# Patient Record
(demographics unavailable — no encounter records)

---

## 2024-12-06 NOTE — HISTORY OF PRESENT ILLNESS
[FreeTextEntry1] : [place] stopped the eliquis around 5/24   Dr Lucas did a stress test and an echo -. cath -. 3 stents (1 on 7/11 and 2 on 7/31/24) -. referred to cardiac rehab. Just started cardiac rehab -. has had 2 session  Told to stop the clopidogrel 2 weeks ago  On the transplant list

## 2024-12-06 NOTE — PHYSICAL EXAM

## 2024-12-06 NOTE — HISTORY OF PRESENT ILLNESS
[FreeTextEntry1] : [place] stopped the eliquis around 5/24   Dr Lucas did a stress test and an echo -. cath -. 3 stents (1 on 7/11 and 2 on 7/31/24) -. referred to cardiac rehab. Just started cardiac rehab -. has had 2 session  Told to stop the clopidogrel 2 weeks ago  On the transplant list

## 2025-01-07 NOTE — HISTORY OF PRESENT ILLNESS
[FreeTextEntry1] : Jan 07, 2025     in person     PCP:  Dr. Josie Avendano (4/30/20)        Nephrology: Dr. Hero Fleming -> C-P to discuss transplant - on list     9/16/2019 started on dialysis - [facility].         Podiatry:  Dr. Tomi Sanchez        Vasc:  Dr. Pk Moreno        Card:  Dr. Theodora Foster         GI: Dr. Audi Kay - colonoscopy          Eyes:  [place] on [facility] - a few months ago.  Advised he has cataracts.                        CC: Type 1 Diabetes (since age 13)         1/2019  A1c 8.3   1/22/20 A1c 9.9 %     12/2023 A1c 9.7%;  3/2024 A1c 6.6%    9/2024  8.1*             CRF (proteinuria ~1992)          9/2019:  started dialysis at [facility]             PVD - 2 stents R foot ~Feb 2015 1/21/2010 R great toe surgery;  then 2nd toe; then R TMT.3/10/2020                         Now s/p R TMT and L great toe surgery              1/2019  TSH 5.41;   3/2021  TSH 4.51   8/26/21  TSH 3.27  65 yo visits for  Type 1 diabetes since age 13, on dialysis since 9/2019, on transplant list, PVD s/p LE stents, s/o R TMT, L great toe surgery, on Eliquis for atrial fibrillation since 1/2023,      Recently had URI - treated at [facility] [place].   Saw Dr. Foster for show pulse in order to optimize him for kidney transplant - will see them January 16, 2025  Had been followed as outpatient by Dr. Comer - sometimes at [facility], for finger  wound.   Sees Dr. Robb now for right 2nd finger wound - still present***  Hospitalized at [facility] - summary appended.    Now following a strict diet and reports sugars much improved.  Doing Cardiac Rehab at [facility]     Injecting insulin at least 3X a day: Basaglar 25 units louis AM  -> LANTUS   And Humalog 75/25  TID AC  - by sliding scale:  if under 140:  0:   if over 140 4 uits;  over 200:  6 units.  Imp:  Infection R 2nd finger needs f/u to [facility]. Feet followed by Dr. Sanchez Continues to find Libre3 helpful and will continue. A1c from [facility] requested.        Sep 17, 2024   in person    Has [employer] - wants Rx t o [facility]   PCP:  Dr. Josie Avendano (4/30/20)        Nephrology: Dr. Hero Fleming -> C-P to discuss transplant - on list     9/16/2019 started on dialysis - [facility].         Podiatry:  Dr. Tomi Sanchez        Vasc:  Dr. Pk Moreno        Card:  Dr. Theodora Foster         GI: Dr. Audi Kay - colonoscopy          Eyes:  [place] on [facility] - a few months ago.  Advised he has cataracts.                        CC: Type 1 Diabetes (since age 13)         1/2019  A1c 8.3   1/22/20 A1c 9.9 %     12/2023 A1c 9.7%;  3/2024 A1c 6.6%    9/2024  8.1*             CRF (proteinuria ~1992)          9/2019:  started dialysis at [facility]             PVD - 2 stents R foot ~Feb 2015 1/21/2010 R great toe surgery;  then 2nd toe; then R TMT.3/10/2020                         Now s/p R TMT and L great toe surgery              1/2019  TSH 5.41;   3/2021  TSH 4.51   8/26/21  TSH 3.27  65 yo with Type 1 diabetes since age 13, on dialysis since 9/2019, on transplant list, PVD s/p LE stents, s/o R TMT, L great toe surgery, on Eliquis for atrial fibrillation since 1/2023,   Had been followed as outpatient by Dr. Comer - sometimes at [facility], for finger  wound. Hospitalized at [facility] - summary appended.    Now following a strict diet and reports sugars much improved.    Injecting insulin at least 3X a day: Basaglar 25 units louis AM   And Humalog 75/25  TID AC  - by sliding scale:  if under 140:  0:   if over 140 4 uits;  over 200:  6 units.  : : Constitutional:  Alert, well nourished, healthy appearance, no acute distress  Eyes:  No proptosis, no stare Thyroid: Pulmonary:  No respiratory distress, no accessory muscle use; normal rate and effort Cardiac:  Normal rate Vascular:  Endocrine:  No stigmata of Cushings Syndrome Musculoskeletal:  Normal gait, no involuntary movements Neurology:  No tremors Affect/Mood/Psych:  Oriented x 3; normal affect, normal insight/judgement, normal mood  . Impression:  Most recent A1c has drifted up to 8.1% He agrees there is room for improvement and he has previously been successful.  Plan:  Dietary adjustment. He continues to find Ann helpful.     Jun 13, 2024     in person                      PCP:  Dr. Josie Avendano (4/30/20)        Nephrology: Dr. Hero Fleming -> C-P to discuss transplant - on list     9/16/2019 started on dialysis - [facility].         Podiatry:  Dr. Tomi Sanchez        Vasc:  Dr. Pk Moreno        Card:  Dr. Theodora Foster         GI: Dr. Audi Kay - colonoscopy          Eyes:  [place] on [facility] - a few months ago.  Advised he has cataracts.                        CC: Type 1 Diabetes (since age 13)         1/2019  A1c 8.3   1/22/20 A1c 9.9 %     12/2023 A1c 9.7%;  3/2024 A1c 6.6%    9/2024  8.1*             CRF (proteinuria ~1992)          9/2019:  started dialysis at [facility]             PVD - 2 stents R foot ~Feb 2015 1/21/2010 R great toe surgery;  then 2nd toe; then R TMT.3/10/2020                         Now s/p R TMT and L great toe surgery              1/2019  TSH 5.41;   3/2021  TSH 4.51   8/26/21  TSH 3.27  65 yo with Type 1 diabetes since age 13, on dialysis since 9/2019, on transplant list, PVD s/p LE stents, s/o R TMT, L great toe surgery, on Eliquis for atrial fibrillation since 1/2023,   Had been followed as outpatient by Dr. Comer - sometimes at [facility], for finger  wound. Hospitalized at [facility] - summary appended.    Now following a strict diet and reports sugars much improved.   Checks fingersticks at least 4X a day. Injecting insulin at least 3X a day: Basaglar 25 units louis AM   And Humalog 75/25  TID AC  - by sliding scale:  if under 140:  0:   if over 140 4 uits;  over 200:  6 units.  : : Constitutional:  Alert, well nourished, healthy appearance, no acute distress  Eyes:  No proptosis, no stare Thyroid: Pulmonary:  No respiratory distress, no accessory muscle use; normal rate and effort Cardiac:  Normal rate Vascular:  Endocrine:  No stigmata of Cushings Syndrome Musculoskeletal:  Normal gait, no involuntary movements Neurology:  No tremors Affect/Mood/Psych:  Oriented x 3; normal affect, normal insight/judgement, normal mood  .  Impression:  Recent A1c up to 8.1 and Libre3 GMI is 8.9.    Plan:  He continues to find Ann helpful in diabetes decisions and insulin dosing. A1c indicates room for improvement.   Apr 16, 2024    in person  PCP:  Dr. Josie Avendano (4/30/20)        Nephrology: Dr. Hero Fleming -> C-P to discuss transplant - on list     9/16/2019 started on dialysis - [facility].         Podiatry:  Dr. Tomi Sanchez        Vasc:  Dr. Pk Moreno        Card:  Dr. Theodora Foster         GI: Dr. Audi Kay - colonoscopy          Eyes:  [place] on [facility] - a few months ago.  Advised he has cataracts.                        CC: Type 1 Diabetes (since age 13)         1/2019  A1c 8.3   1/22/20 A1c 9.9 %     12/2023 A1c 9.7%;  3/2024 A1c 6.6%              CRF (proteinuria ~1992)          9/2019:  started dialysis at [facility]             PV - 2 stents R foot ~Feb 2015 1/21/2010 R great toe surgery;  then 2nd toe; then R TMT.3/10/2020                         Now s/p R TMT and L great toe surgery              1/2019  TSH 5.41;   3/2021  TSH 4.51   8/26/21  TSH 3.27  64 yo with Type 1 diabetes since age 13, on dialysis since 9/2019, on transplant list, PVD s/p LE stents, s/o R TMT, L great toe surgery, on Eliquis for atrial fibrillation since 1/2023, anticipates Watchman procedure at - by Dr. Reed end of May 2023.  Had been followed as outpatient by Dr. Comer - sometimes at [facility], for finger  wound. Hospitalized at [facility] - summary appended.    Now following a strict diet and reports sugars much improved.   Cheks fingersticks at least 4X a day. Injecting insulin at least 3X a day: Basaglar 25 units louis AM   And Humalog 75/25  TID AC  - by sliding scale:  if under 140:  0:   if over 140 4 uits;  over 200:  6 units.  : : Constitutional:  Alert, well nourished, healthy appearance, no acute distress  Eyes:  No proptosis, no stare Thyroid: Pulmonary:  No respiratory distress, no accessory muscle use; normal rate and effort Cardiac:  Normal rate Vascular:  Endocrine:  No stigmata of Cushings Syndrome Musculoskeletal:  Normal gait, no involuntary movements Neurology:  No tremors Affect/Mood/Psych:  Oriented x 3; normal affect, normal insight/judgement, normal mood  . Impression:  His fingerstick BS and A1c results show that he is doing very good job of cotrolling sugars currently. He would be appropriate candidate for the Ann 3.  Plan:  Same Rx. Ann 3 requested via [employer].     A1c trajectory June 2022  A1c 9.8 Dec 2022 A1c 10.0% Mar 8, 2023 A1c 9.8 % at [facility]  Jann 16, 2024  A1c 8.4 %     Feb 06, 2024    in person                  PCP:  Dr. Josie Avendano (4/30/20)        Nephrology: Dr. Hero Fleming -> C-P to discuss transplant - on list     9/16/2019 started on dialysis - [facility].         Podiatry:  Dr. Tomi Sanchez        Vasc:  Dr. Pk Moreno        Card:  Dr. Theodora Foster         GI: Dr. Audi Kay - colonoscopy          Eyes:  [place] on [facility] - a few months ago.  Advised he has cataracts.                        CC: Type 1 Diabetes (since age 13)         1/2019  A1c 8.3   1/22/20 A1c 9.9 %          [place] [facility] [place].               CRF (proteinuria ~1992)          9/2019:  started dialysis at [facility]             PVD - 2 stents R foot ~Feb 2015 1/21/2010 R great toe surgery;  then 2nd toe; then R TMT.3/10/2020                         Now s/p R TMT and L great toe surgery              1/2019  TSH 5.41;   3/2021  TSH 4.51   8/26/21  TSH 3.27  64 yo with Type 1 diabetes since age 13, on dialysis since 9/2019, on transplant list, PVD s/p LE stents, s/o R TMT, L great toe surgery, on Eliquis for atrial fibrillation since 1/2023, anticipates Watchman procedure at - by Dr. Reed end of May 2023.  Had been followed as outpatient by Dr. Comer - sometimes at [facility], for finger  wound. Hospialized at [facility] - summary appended.    Now following a strict diet and reports sugars much improved.   Cheks fingersticks at least 4X a day. Injecting insulin at least 3X a day: Basaglar 25 units louis AM   And Humalog 75/25  TID AC  - by sliding scale:  if under 140:  0:   if over 140 4 uits;  over 200:  6 units.  A1c trajectory June 2022  A1c 9.8 Dec 2022 A1c 10.0% Mar 8, 2023 A1c 9.8 % at [facility]   : : Constitutional:  Alert, well nourished, healthy appearance, no acute distress  Eyes:  No proptosis, no stare Thyroid: Pulmonary:  No respiratory distress, no accessory muscle use; normal rate and effort Cardiac:  Normal rate Vascular:  Endocrine:  No stigmata of Cushings Syndrome Musculoskeletal:  Normal gait, no involuntary movements Neurology:  No tremors Affect/Mood/Psych:  Oriented x 3; normal affect, normal insight/judgement, normal mood  .  Impression:  Brittle Type 1 diabetes, on dialysis.  Would benefit from use of CGM - e.g., Ann 3.   Jan 23, 2024     telephonic     recently admitted to [facility] - elev K and infected finger  PCP:  Dr. Josie Avendano (4/30/20)        Nephrology: Dr. Hero Fleming -> [facility] to discuss transplant - on list     9/16/2019 started on dialysis - [facility].         Podiatry:  Dr. Tomi Sanchez        Vasc:  Dr. Pk Moreno        Card:  Dr. Theodora Foster         GI: Dr. Audi Kay - colonoscopy          Eyes:  [place] on [facility] - a few months ago.  Advised he has cataracts.                        CC: Type 1 Diabetes (since age 13)         1/2019  A1c 8.3   1/22/20 A1c 9.9 %          [facility].               CRF (proteinuria ~1992)          9/2019:  started dialysis at [facility]             PVD - 2 stents R foot ~Feb 2015 1/21/2010 R great toe surgery;  then 2nd toe; then R TMT.3/10/2020                         Now s/p R TMT and L great toe surgery              1/2019  TSH 5.41;   3/2021  TSH 4.51   8/26/21  TSH 3.27  64 yo with Type 1 diabetes since age 13, on dialysis since 9/2019, on transplant list, PVD s/p LE stents, s/o R TMT, L great toe surgery, on Eliquis for atrial fibrillation since 1/2023, anticipates Watchman procedure at [facility] by Dr. Reed end of May 2023.  Had been followed as outpatient by Dr. Comer - sometimes at [facility], for finger  wound. Hospialized at [facility] - summary appended.    Now following a strict diet and reports sugars much improved.   Injecting insulin at least 3X a day: Basaglar 25 units louis AM   And Humalog 75/25  TID AC  - by sliding scale:  if under 140:  0:   if over 140 4 uits;  over 200:  6 units.   June 2022  A1c 9.8 Dec 2022 A1c 10.0% Mar 8, 2023 A1c 9.8 % at [facility]   Imp/Plan:  All things considered, he has been doing a good job taking care of himself and diabetes.       May 16, 2023    in person  PCP:  Dr. Josie Avendano (4/30/20)        Nephrology: Dr. Hero Fleming -> [facility] to discuss transplant - on list     9/16/2019 started on dialysis - [facility].         Podiatry:  Dr. Tomi Sanchez        Vasc:  Dr. Pk Moreno        Card:  Dr. Theodora Foster         GI: Dr. Audi Kay - colonoscopy          Eyes:  [place] on [facility] - a few months ago.  Advised he has cataracts.                        CC: Type 1 Diabetes (since age 13)         1/2019  A1c 8.3   1/22/20 A1c 9.9 %          [place] [facility] [place].               CRF (proteinuria ~1992)          9/2019:  started dialysis at [facility]             PVD - 2 stents R foot ~Feb 2015 1/21/2010 R great toe surgery;  then 2nd toe; then R TMT.3/10/2020                         Now s/p R TMT and L great toe surgery              1/2019  TSH 5.41;   3/2021  TSH 4.51   8/26/21  TSH 3.27  61 yo with Type 1 diabetes since age 13, on dialysis since 9/2019, on transplant list, PVD s/p LE stents, s/o R TMT, L great toe surgery, on Eliquis for atrial fibrillation since 1/2023, anticipates Watchman procedure at - by Dr. Reed end of May 2023.  Brings in his Freestyle Lite meter and data reviewed - testing 4X a day with  One Touch Ultra Mini which shows his sugars run on the high side - in part because of dialysis on Mon, Wed, Fri   [facility] will be faxing results to us.      No recent hypoglycemia.     Injecting insulin at least 3X a day: Basaglar 23 units louis AM   And Humalog 75/25  TID AC  - by sliding scale:  if under 140:  0:   if over 140 4 uits;  over 200:  6 units.  June 2022  A1c 9.8 Dec 2022 A1c 10.0% Mar 8, 2023 A1c 9.8 % at [facility]   : : Constitutional:  Alert, well nourished, healthy appearance, no acute distress  Eyes:  No proptosis, no stare Thyroid: Pulmonary:  No respiratory distress, no accessory muscle use; normal rate and effort Cardiac:  Normal rate Vascular:  Endocrine:  No stigmata of Cushing's Syndrome Musculoskeletal:  Normal gait, no involuntary movements Neurology:  No tremors Affect/Mood/Psych:  Oriented x 3; normal affect, normal insight/judgement, normal mood  .  Impression:  Anticipates bilateral cateract surgery - Dr. Roa. Feet being monitored b Dr. Sanchez  Doing his best to control sugars   Dec 13, 2022     in person  PCP:  Dr. Josie Avendano (4/30/20)        Nephrology: Dr. Hero Fleming -> [facility] to discuss transplant - on list     9/16/2019 started on dialysis - [facility].         Podiatry:  Dr. Tomi Sanchez        Vasc:  Dr. Pk Moreno        Card:  Dr. Theodora Foster         GI: Dr. Audi Kay - colonoscopy          Eyes:  [place] on [facility] - a few months ago.  Advised he has cataracts.                        CC: Type 1 Diabetes (since age 13)         1/2019  A1c 8.3   1/22/20 A1c 9.9 %          His [employer] does not have NFC.               CRF (proteinuria ~1992)          9/2019:  started dialysis at [facility]             PV - 2 stents R foot ~Feb 2015 1/21/2010 R great toe surgery;  then 2nd toe; then R TMT.3/10/2020                         Now s/p R TMT and L great toe surgery              1/2019  TSH 5.41;   3/2021  TSH 4.51   8/26/21  TSH 3.27  62 yo visits for Type 1 diabetes.   Lab tests at [facility] on [place], learned of "insulin ":  Actos, Januvia, semaglutide  Brings in his Freestyle Lite meter and data reviewed - testing 4X a day. No recent hypoglycemia.     Injecting insulin at least 3X a day: Basaglar 23 units louis AM   And Humalog 75/25  TID AC  - by sliding scale:  if under 140:  0:   if over 140 4 uits;  over 200:  6 units.  June A1c 9.8  : : Constitutional:  Alert, well nourished, healthy appearance, no acute distress  Eyes:  No proptosis, no stare Thyroid: Pulmonary:  No respiratory distress, no accessory muscle use; normal rate and effort Cardiac:  Normal rate Vascular:  Endocrine:  No stigmata of Cushing's Syndrome Musculoskeletal:  Normal gait, no involuntary movements Neurology:  No tremors Affect/Mood/Psych:  Oriented x 3; normal affect, normal insight/judgement, normal mood  .  Impression:  Room for improvement. Plan:  f/u eye exam.   Lbrie 2 requested from [facility].      Jul 26, 2022      in person               No success with EP, Bor.        [facility].      PCP:  Dr. Josie Avendano (4/30/20)        Nephrology: Dr. Hero Fleming -> C-P to discuss transplant - on list     9/16/2019 started on dialysis - [facility].         Podiatry:  Dr. Tomi Sanchez        Vasc:  Dr. Pk Moreno        Card:  Dr. Theodora Foster         GI: Dr. Audi Kay - colonoscopy          Eyes:  [place] on [facility] - a few months ago.  Advised he has cataracts.                        CC: Type 1 Diabetes (since age 13)         1/2019  A1c 8.3   1/22/20 A1c 9.9 %          [place] [facility] [place].               CRF (proteinuria ~1992)          9/2019:  started dialysis at [facility]             PVD - 2 stents R foot ~Feb 2015 1/21/2010 R great toe surgery;  then 2nd toe; then R TMT.3/10/2020                         Now s/p R TMT and L great toe surgery              1/2019  TSH 5.41;   3/2021  TSH 4.51   8/26/21  TSH 3.27  62 yo visits for Type 1 diabetes.   Lab tests at [facility] on [place], learned of "insulin ":  Actos, Januvia, semaglutide  Brings in his Freestyle Lite meter and data reviewed - testing 4X a day. No recent hypoglycemia.     Taking:   Basaglar 23 units louis AM   And Humalog 75/25  TID AC  - by sliding scale:  if under 140:  0:   if over 140 4 uits;  over 200:  6 units.    : : Constitutional:  Alert, well nourished, healthy appearance, no acute distress  Eyes:  No proptosis, no stare Thyroid: Pulmonary:  No respiratory distress, no accessory muscle use; normal rate and effort Cardiac:  Normal rate Vascular:  Endocrine:  No stigmata of Cushing's Syndrome Musculoskeletal:  Normal gait, no involuntary movements Neurology:  No tremors Affect/Mood/Psych:  Oriented x 3; normal affect, normal insight/judgement, normal mood  .  Impression:  A1c and fingerstick BS suggest room to raise dose of insulin; Plan: Inc Basaglar to 24 units. Call me one week.      Mar 22, 2022      in person     primary:  [employer] Medicare Rx and [place] Medicaid secondary.  NOT covered for EP    PCP:  Dr. Josie Avendano (4/30/20)        Nephrology: Dr. Hero Fleming -> C-P to discuss transplant - on list     9/16/2019 started on dialysis - [facility].         Podiatry:  Dr. Tomi Sanchez        Vasc:  Dr. Pk Moreno        Card:  Dr. Theodora Foster         GI: Dr. Audi Kay - colonoscopy          Eyes:  [place] on [facility] - a few months ago.  Advised he has cataracts.                        CC: Type 1 Diabetes (since age 13)         1/2019  A1c 8.3   1/22/20 A1c 9.9 %          [place] [facility] [place].               CRF (proteinuria ~1992)          9/2019:  started dialysis at [facility]             PVD - 2 stents R foot ~Feb 2015 1/21/2010 R great toe surgery;  then 2nd toe; then R TMT.3/10/2020                         Now s/p R TMT and L great toe surgery              1/2019  TSH 5.41;   3/2021  TSH 4.51   8/26/21  TSH 3.27  62 yo visits for Type 1 diabetes.   Lab tests at [facility] on 8/26/2021 A1c 10.1 %      fructosamine 548   He remains on dialysis at [facility] on Mon, Wed, Fri.  Brings in the OneTouch Ultra Mini with which he is checking his fingerstick sugars at least 4X a day - shows wide fluctuations.   To control diabetes his insulin Rx is:   Lantus pens 22 units at 5 am and now switched to Basaglar And Humalog 75/25  TID 1c - by sliding scale:  if under 140:  0:   if over 140 4 uits;  over 200:  6 units.  : : Constitutional:  Alert, well nourished, healthy appearance, no acute distress  Eyes:  No proptosis, no stare Thyroid: Pulmonary:  No respiratory distress, no accessory muscle use; normal rate and effort Cardiac:  Normal rate Vascular:  Endocrine:  No stigmata of Cushing's Syndrome Musculoskeletal:  Normal gait, no involuntary movements Neurology:  No tremors Affect/Mood/Psych:  Oriented x 3; normal affect, normal insight/judgement, normal mood  .  Impression:  Although he is doing his best to control sugars, the renal failure, dialysis make it more challenging. Fortunately, no current foot lesions. He anticipates need for cataract surgery by Dr. Roa and will touch base with me on insulin dosing priror to that. ROV no later than July. He will stop by [facility] today regarding CGM.     Sep 13, 2021      in person               PCP:  Dr. Josie Avendano (4/30/20)        Nephrology: Dr. Hero Fleming -> C-P to discuss transplant - on list     9/16/2019 started on dialysis - [facility].         Podiatry:  Dr. Tomi Sanchez        Vasc:  Dr. Pk Moreno        Card:  Dr. Theodora Foster         GI: Dr. Audi Kay - colonoscopy          Eyes:  [place] on [facility] - a few months ago.  Advised he has cataracts.                        CC: Type 1 Diabetes (since age 13)         1/2019  A1c 8.3   1/22/20 A1c 9.9 %          His [employer] does not have NFC.               CRF (proteinuria ~1992)          9/2019:  started dialysis at [facility]             PVD - 2 stents R foot ~Feb 2015 1/21/2010 R great toe surgery;  then 2nd toe; then R TMT.3/10/2020                         Now s/p R TMT and L great toe surgery              1/2019  TSH 5.41;   3/2021  TSH 4.51   8/26/21  TSH 3.27  62 yo visits for Type 1 diabetes.   Lab tests at [facility] on 8/26/2021 A1c 10.1 %      fructosamine 548  glucose 295 mg/dl  creatinine 6.32 TSH 3.27 (had been 5.41 in Jan 2019 and 4.51 in Mar 2021)   Goes for dialysis on M, W, F  - prone to hypoglycemia during dialysis  Has Lantus pens at home and takes 22 units at 5 am and insurance now covers Semglee (also glargine) And Humalog 75/25  TID 1c - by sliding scale:  if under 140:  0:   if over 140 4 uits;  over 200:  6 units.  No open wounds. Still has burning sensation of lower extremities .    Plan:  Ann 14 requested via [employer]  ([employer] used [facility])   ROV in Jan and March.    .

## 2025-01-17 NOTE — HEALTH RISK ASSESSMENT
[Never (0 pts)] : Never (0 points) [No] : In the past 12 months have you used drugs other than those required for medical reasons? No [No falls in past year] : Patient reported no falls in the past year [0] : 2) Feeling down, depressed, or hopeless: Not at all (0) [PHQ-2 Negative - No further assessment needed] : PHQ-2 Negative - No further assessment needed [Former] : Former [de-identified] : quit 31 yrs ago [de-identified] : none [de-identified] : Regular  [[id number]] : 0 [de-identified] : half a pack a day for 2-3 years

## 2025-01-17 NOTE — HISTORY OF PRESENT ILLNESS
[de-identified] : Patient is a 64M with Type 1 DM (diagnosed at 12yo), ESRD on HD M/W/F (Since sept 2019, follows with Dr. Fleming, HD at [facility]), CAD s/p mLAD PCI (9/22), diastolic dysfunction, PAD (following with Dr. Moreno)with hx of RLE balloon angioplasty and 2 RANJITH in right distal AT (2/2015), right popliteal to DP bypass and right first metatarsal amputation, amputation of 2nd toe and partial metatarsal 3/5/2020 followed by a trans-metatarsal amputation on 3/10/2020, left partial great toe and left partial 2nd toe amputation (2021), recent staged PCI (1 on 7/11/24, 2 on 7/31/24) previous PCI to mLAD in 2022,  pAF (EP Dr. Corrie Dimas) presents today in follow up for pain management and cough  cough x 3 weeks. Wife was very sick with cough as well.   Recent stents x 3 in July Cardio: Dr. Fabi Carson with [facility]  Long discussion with Abelardo regarding chronic pain (see assessment)

## 2025-01-17 NOTE — HISTORY OF PRESENT ILLNESS
[de-identified] : Patient is a 64M with Type 1 DM (diagnosed at 14yo), ESRD on HD M/W/F (Since sept 2019, follows with Dr. Fleming, HD at [facility]), CAD s/p mLAD PCI (9/22), diastolic dysfunction, PAD (following with Dr. Moreno)with hx of RLE balloon angioplasty and 2 RANJITH in right distal AT (2/2015), right popliteal to DP bypass and right first metatarsal amputation, amputation of 2nd toe and partial metatarsal 3/5/2020 followed by a trans-metatarsal amputation on 3/10/2020, left partial great toe and left partial 2nd toe amputation (2021), recent staged PCI (1 on 7/11/24, 2 on 7/31/24) previous PCI to mLAD in 2022,  pAF (EP Dr. Corrie Dimas) presents today in follow up for pain management and cough  cough x 3 weeks. Wife was very sick with cough as well.   Recent stents x 3 in July Cardio: Dr. Fabi Carson with [facility]  Long discussion with Abelardo regarding chronic pain (see assessment)

## 2025-01-17 NOTE — HEALTH RISK ASSESSMENT
[Never (0 pts)] : Never (0 points) [No] : In the past 12 months have you used drugs other than those required for medical reasons? No [No falls in past year] : Patient reported no falls in the past year [0] : 2) Feeling down, depressed, or hopeless: Not at all (0) [PHQ-2 Negative - No further assessment needed] : PHQ-2 Negative - No further assessment needed [Former] : Former [de-identified] : quit 31 yrs ago [de-identified] : none [de-identified] : Regular  [[id number]] : 0 [de-identified] : half a pack a day for 2-3 years

## 2025-02-06 NOTE — REVIEW OF SYSTEMS
[Fever] : no fever [Chills] : no chills [Joint Swelling] : no joint swelling [Limb Pain] : no limb pain [Limb Swelling] : no limb swelling [Skin Wound] : skin wound

## 2025-02-06 NOTE — HISTORY OF PRESENT ILLNESS
[FreeTextEntry1] : 63-year-old male well-known to me.  He is status post a right upper extremity angiogram and angioplasty of his radial artery for gangrene of his pointer finger.  Patient reports that he has been undergoing local wound care at [facility] performed by his hand surgeon.  He reports that his finger pain has resolved.  He reports that his wound is healing. He continues to take Eliquis and Plavix as prescribed.  [de-identified] : 64-year-old male well-known to me. He has a history of a right pop dp bypass, right TMA and a left lower extremity angiogram and angioplasty for gangrene. He is status post a right upper extremity angiogram and angioplasty of his radial artery for gangrene of his pointer finger. He reports he remains well since the last visit. He reports the wound on his pointer finger is still not healed. He underwent bypass graft duplex of the lower extremity and is here to discuss the results and additional treatment options. He continues to undergo uneventful HD via the left brachial cephalic AVF.

## 2025-02-06 NOTE — DATA REVIEWED
[FreeTextEntry1] : Bypass graft duplex - patent bypass without anastomotic stenosis, normal intragraft velocities

## 2025-02-06 NOTE — PHYSICAL EXAM
[Alert] : alert [Oriented to Person] : oriented to person [Oriented to Place] : oriented to place [Oriented to Time] : oriented to time [0] : right 0 [2+] : right 2+ [de-identified] : Awake and Alert [FreeTextEntry1] : 2+ bypass graft pulse [de-identified] : right pointer finger with necrotic ulceration -remainder of the fingers are healed, TMA well healed [de-identified] : Appropriate

## 2025-02-06 NOTE — HISTORY OF PRESENT ILLNESS
[FreeTextEntry1] : 63-year-old male well-known to me.  He is status post a right upper extremity angiogram and angioplasty of his radial artery for gangrene of his pointer finger.  Patient reports that he has been undergoing local wound care at [facility] performed by his hand surgeon.  He reports that his finger pain has resolved.  He reports that his wound is healing. He continues to take Eliquis and Plavix as prescribed.  [de-identified] : 64-year-old male well-known to me. He has a history of a right pop dp bypass, right TMA and a left lower extremity angiogram and angioplasty for gangrene. He is status post a right upper extremity angiogram and angioplasty of his radial artery for gangrene of his pointer finger. He reports he remains well since the last visit. He reports the wound on his pointer finger is still not healed. He underwent bypass graft duplex of the lower extremity and is here to discuss the results and additional treatment options. He continues to undergo uneventful HD via the left brachial cephalic AVF.

## 2025-02-06 NOTE — ASSESSMENT
[FreeTextEntry1] : 63 yo male with severe PAD. He is s/p a right pop dp bypass.  He has a 2+ bypass graft pulse on the right. The patient underwent a right lower extremity arterial duplex that demonstrated a patent bypass graft with no anastomotic stenosis and normal intragraft velocities.   He is also status post an angiogram of his right upper extremity and angioplasty of his right radial artery secondary to gangrene of multiple fingers. His pointer finger continues to have a gangrenous ulcer. I recommended that he undergo an upper extremity us. He may benefit from a repeat angiogram.   Repeat bypass graft duplex in 1 year  Follow up when the results of the right upper extremity angiogram are available. Local wound care to pointer finger.

## 2025-02-06 NOTE — PHYSICAL EXAM
[Alert] : alert [Oriented to Person] : oriented to person [Oriented to Place] : oriented to place [Oriented to Time] : oriented to time [0] : right 0 [2+] : right 2+ [de-identified] : Awake and Alert [FreeTextEntry1] : 2+ bypass graft pulse [de-identified] : right pointer finger with necrotic ulceration -remainder of the fingers are healed, TMA well healed [de-identified] : Appropriate

## 2025-02-13 NOTE — HEALTH RISK ASSESSMENT
[Never (0 pts)] : Never (0 points) [No] : In the past 12 months have you used drugs other than those required for medical reasons? No [PHQ-2 Negative - No further assessment needed] : PHQ-2 Negative - No further assessment needed [Former] : Former [No falls in past year] : Patient reported no falls in the past year [0] : 2) Feeling down, depressed, or hopeless: Not at all (0) [de-identified] : quit 31 yrs ago [de-identified] : none [de-identified] : Regular  [[id number]] : 0 [de-identified] : half a pack a day for 2-3 years

## 2025-02-13 NOTE — HISTORY OF PRESENT ILLNESS
[FreeTextEntry8] : Patient is a 64M with Type 1 DM (diagnosed at 14yo), ESRD on HD M/W/F (Since sept 2019, follows with Dr. Fleming, HD at [facility]), CAD s/p mLAD PCI (9/22), diastolic dysfunction, PAD (following with Dr. Moreno)with hx of RLE balloon angioplasty and 2 RANJITH in right distal AT (2/2015), right popliteal to DP bypass and right first metatarsal amputation, amputation of 2nd toe and partial metatarsal 3/5/2020 followed by a trans-metatarsal amputation on 3/10/2020, left partial great toe and left partial 2nd toe amputation (2021), recent staged PCI (1 on 7/11/24, 2 on 7/31/24) previous PCI to mLAD in 2022, pAF (EP Dr. Corrie Dimas) presents today for continued cough  Now with cough x 7 weeks total I saw Abelardo 4 weeks ago and rx'd doxycycline which improved symptoms for about 2 weeks Cough is consistent and keeping him up at night Doing cardiac rehab and noting it is difficult but he gets through the exercises which are vigorous

## 2025-02-13 NOTE — HEALTH RISK ASSESSMENT
[Never (0 pts)] : Never (0 points) [No] : In the past 12 months have you used drugs other than those required for medical reasons? No [PHQ-2 Negative - No further assessment needed] : PHQ-2 Negative - No further assessment needed [Former] : Former [No falls in past year] : Patient reported no falls in the past year [0] : 2) Feeling down, depressed, or hopeless: Not at all (0) [de-identified] : quit 31 yrs ago [de-identified] : none [de-identified] : Regular  [[id number]] : 0 [de-identified] : half a pack a day for 2-3 years

## 2025-02-13 NOTE — REVIEW OF SYSTEMS
[Cough] : cough [Dyspnea on Exertion] : dyspnea on exertion [Joint Pain] : joint pain [Muscle Pain] : muscle pain [Negative] : Heme/Lymph

## 2025-02-13 NOTE — PHYSICAL EXAM
[Normal Sclera/Conjunctiva] : normal sclera/conjunctiva [Normal Outer Ear/Nose] : the outer ears and nose were normal in appearance [No Lymphadenopathy] : no lymphadenopathy [Supple] : supple [No Respiratory Distress] : no respiratory distress  [No Accessory Muscle Use] : no accessory muscle use [Normal] : affect was normal and insight and judgment were intact [de-identified] : RML and RLL with crackles, LLL with crackles

## 2025-02-13 NOTE — HISTORY OF PRESENT ILLNESS
[FreeTextEntry8] : Patient is a 64M with Type 1 DM (diagnosed at 12yo), ESRD on HD M/W/F (Since sept 2019, follows with Dr. Fleming, HD at [facility]), CAD s/p mLAD PCI (9/22), diastolic dysfunction, PAD (following with Dr. Moreno)with hx of RLE balloon angioplasty and 2 RANJITH in right distal AT (2/2015), right popliteal to DP bypass and right first metatarsal amputation, amputation of 2nd toe and partial metatarsal 3/5/2020 followed by a trans-metatarsal amputation on 3/10/2020, left partial great toe and left partial 2nd toe amputation (2021), recent staged PCI (1 on 7/11/24, 2 on 7/31/24) previous PCI to mLAD in 2022, pAF (EP Dr. Corrie Dimas) presents today for continued cough  Now with cough x 7 weeks total I saw Abelardo 4 weeks ago and rx'd doxycycline which improved symptoms for about 2 weeks Cough is consistent and keeping him up at night Doing cardiac rehab and noting it is difficult but he gets through the exercises which are vigorous

## 2025-02-13 NOTE — PHYSICAL EXAM
[Normal Sclera/Conjunctiva] : normal sclera/conjunctiva [Normal Outer Ear/Nose] : the outer ears and nose were normal in appearance [No Lymphadenopathy] : no lymphadenopathy [Supple] : supple [No Respiratory Distress] : no respiratory distress  [No Accessory Muscle Use] : no accessory muscle use [Normal] : affect was normal and insight and judgment were intact [de-identified] : RML and RLL with crackles, LLL with crackles

## 2025-02-24 NOTE — HISTORY OF PRESENT ILLNESS
[de-identified] : Patient is a 64M with Type 1 DM (diagnosed at 14yo), ESRD on HD M/W/F (Since sept 2019, follows with Dr. Fleming, HD at [facility]), CAD s/p mLAD PCI (9/22), diastolic dysfunction, PAD (following with Dr. Moreno)with hx of RLE balloon angioplasty and 2 RANJITH in right distal AT (2/2015), right popliteal to DP bypass and right first metatarsal amputation, amputation of 2nd toe and partial metatarsal 3/5/2020 followed by a trans-metatarsal amputation on 3/10/2020, left partial great toe and left partial 2nd toe amputation (2021), recent staged PCI (1 on 7/11/24, 2 on 7/31/24) previous PCI to mLAD in 2022,  pAF (EP Dr. Corrie Dimas) presents today in follow up for pneumonia  Finally feeling better after course of Levaquin Breathing better as well Cough resolved last week

## 2025-02-24 NOTE — HISTORY OF PRESENT ILLNESS
[de-identified] : Patient is a 64M with Type 1 DM (diagnosed at 14yo), ESRD on HD M/W/F (Since sept 2019, follows with Dr. Fleming, HD at [facility]), CAD s/p mLAD PCI (9/22), diastolic dysfunction, PAD (following with Dr. Moreno)with hx of RLE balloon angioplasty and 2 RANJITH in right distal AT (2/2015), right popliteal to DP bypass and right first metatarsal amputation, amputation of 2nd toe and partial metatarsal 3/5/2020 followed by a trans-metatarsal amputation on 3/10/2020, left partial great toe and left partial 2nd toe amputation (2021), recent staged PCI (1 on 7/11/24, 2 on 7/31/24) previous PCI to mLAD in 2022,  pAF (EP Dr. Corrie Dimas) presents today in follow up for pneumonia  Finally feeling better after course of Levaquin Breathing better as well Cough resolved last week

## 2025-02-24 NOTE — HEALTH RISK ASSESSMENT
[Never (0 pts)] : Never (0 points) [No] : In the past 12 months have you used drugs other than those required for medical reasons? No [No falls in past year] : Patient reported no falls in the past year [0] : 2) Feeling down, depressed, or hopeless: Not at all (0) [PHQ-2 Negative - No further assessment needed] : PHQ-2 Negative - No further assessment needed [Former] : Former [de-identified] : quit 31 yrs ago [de-identified] : none [de-identified] : Regular  [[id number]] : 0 [de-identified] : half a pack a day for 2-3 years

## 2025-02-24 NOTE — HEALTH RISK ASSESSMENT
[Never (0 pts)] : Never (0 points) [No] : In the past 12 months have you used drugs other than those required for medical reasons? No [No falls in past year] : Patient reported no falls in the past year [0] : 2) Feeling down, depressed, or hopeless: Not at all (0) [PHQ-2 Negative - No further assessment needed] : PHQ-2 Negative - No further assessment needed [Former] : Former [de-identified] : quit 31 yrs ago [de-identified] : none [de-identified] : Regular  [[id number]] : 0 [de-identified] : half a pack a day for 2-3 years

## 2025-03-05 NOTE — PHYSICAL EXAM
[0] : right 0 [2+] : right 2+ [Alert] : alert [Oriented to Person] : oriented to person [Oriented to Place] : oriented to place [Oriented to Time] : oriented to time [de-identified] : Awake and Alert [FreeTextEntry1] : 2+ bypass graft pulse [de-identified] : right pointer finger with necrotic ulceration -remainder of the fingers are healed, TMA well healed [de-identified] : Appropriate

## 2025-03-05 NOTE — REVIEW OF SYSTEMS
[Skin Wound] : skin wound [Fever] : no fever [Chills] : no chills [Joint Swelling] : no joint swelling [Limb Pain] : no limb pain [Limb Swelling] : no limb swelling

## 2025-03-05 NOTE — ASSESSMENT
[FreeTextEntry1] : 63 yo male with severe PAD.  He is also status post an angiogram of his right upper extremity and angioplasty of his right radial artery secondary to gangrene of multiple fingers. His pointer finger continues to remain unhealed despite undergoing local wound care. He underwent a right upper extremity arterial duplex which demonstrated that his radial artery is occluded. I explained to the patient that the pointer finger is in the radial artery distribution. and that I believe he would benefit from a repeat intervention.  Risks and benefits were discussed including infection, bleeding and hematoma and he would like to proceed. He will be scheduled for the procedure as soon as possible.  Continue local wound care to pointer finger daily.

## 2025-03-05 NOTE — DATA REVIEWED
[FreeTextEntry1] : Right upper extremity arterial duplex - personally reviewed- radial artery occlusion

## 2025-03-05 NOTE — HISTORY OF PRESENT ILLNESS
[FreeTextEntry1] : 63-year-old male well-known to me.  He is status post a right upper extremity angiogram and angioplasty of his radial artery for gangrene of his pointer finger.  Patient reports that he has been undergoing local wound care at [facility] performed by his hand surgeon.  He reports that his finger pain has resolved.  He reports that his wound is healing. He continues to take Eliquis and Plavix as prescribed.  [de-identified] : 64-year-old male well-known to me. He has a history of a right pop dp bypass, right TMA and a left lower extremity angiogram and angioplasty for gangrene. He is status post a right upper extremity angiogram and angioplasty of his radial artery for gangrene of his pointer finger. He continues to have an ulcer on his pointer finger despite undergoing local wound care. He underwent right upper extremity arterial duplex and is here to discuss the results and additional treatment options. He continues to undergo uneventful HD via the left brachial cephalic AVF.

## 2025-03-05 NOTE — END OF VISIT
[FreeTextEntry3] :  All medical record entries made by the azeemibe were at myLEONEL KENNETH, direction and personally dictated by me on 3/5/2025. I have reviewed the chart and agree that the record accurately reflects my personal performance of the history, physical exam, assessment, and plan. I have also personally directed, reviewed, and agreed with the chart.

## 2025-04-23 NOTE — END OF VISIT
[FreeTextEntry3] :  All medical record entries made by the azeemibe were at LEONEL pa KENNETH, direction and personally dictated by me on 4/23/2025. I have reviewed the chart and agree that the record accurately reflects my personal performance of the history, physical exam, assessment, and plan. I have also personally directed, reviewed, and agreed with the chart.

## 2025-04-23 NOTE — HISTORY OF PRESENT ILLNESS
[FreeTextEntry1] : 63-year-old male well-known to me.  He is status post a right upper extremity angiogram and angioplasty of his radial artery for gangrene of his pointer finger.  Patient reports that he has been undergoing local wound care at [facility] performed by his hand surgeon.  He reports that his finger pain has resolved.  He reports that his wound is healing. He continues to take Eliquis and Plavix as prescribed.  [de-identified] : 64-year-old male well-known to me. He has a history of a right pop dp bypass, right TMA and a left lower extremity angiogram and angioplasty for gangrene. He is status post a right upper extremity angiogram and angioplasty of his radial artery for gangrene of his pointer finger. He continued to have an ulcer despite local wound care and underwent an arterial duplex which demonstrated a recurrent radial artery occlusion.  He is now s/p a repeat right upper extremity angiogram and angioplasty of his radial artery for gangrene of his pointer finger approx. two weeks ago. The patient reports the wound appears to be getting better.  He is here for a pos operative wound check.

## 2025-04-23 NOTE — PHYSICAL EXAM
[Alert] : alert [Oriented to Person] : oriented to person [Oriented to Place] : oriented to place [Oriented to Time] : oriented to time [1+] : right 1+ [de-identified] : Awake and Alert [FreeTextEntry1] : biphasic radial artery pulse [de-identified] : right pointer finger with  ulceration - healing , remainder of the fingers are healed [de-identified] : Appropriate

## 2025-04-23 NOTE — ASSESSMENT
[FreeTextEntry1] : 63 yo male with severe PAD.  He is also status post an angiogram of his right upper extremity and angioplasty of his right radial artery secondary to gangrene of multiple fingers. His pointer finger remained unhealed despite undergoing local wound care and a arterial duplex demonstrated a recurrent radial artery occlusion. He is now s/p a repeat right upper extremity angiogram  and angioplasty of his right radial artery  2 weeks ago. On exam the patient has a 1+ radial artery pulse and a biphasic radial artery signal. The patient's ulceration appears to be healing.  . I am hopeful the wound will heal in the next several weeks. The patient will follow up in 6 weeks for a wound check.  Continue local wound care to pointer finger daily.

## 2025-06-04 NOTE — HISTORY OF PRESENT ILLNESS
[FreeTextEntry1] : 63-year-old male well-known to me.  He is status post a right upper extremity angiogram and angioplasty of his radial artery for gangrene of his pointer finger.  Patient reports that he has been undergoing local wound care at [facility] performed by his hand surgeon.  He reports that his finger pain has resolved.  He reports that his wound is healing. He continues to take Eliquis and Plavix as prescribed.  [de-identified] : 64-year-old male well-known to me. He has a history of a right pop dp bypass, right TMA and a left lower extremity angiogram and angioplasty for gangrene. He is status post a right upper extremity angiogram and angioplasty of his radial artery for gangrene of his pointer finger. He continued to have an ulcer despite local wound care and underwent an arterial duplex which demonstrated a recurrent radial artery occlusion.  He is now s/p a repeat right upper extremity angiogram and angioplasty of his radial artery for gangrene of his pointer finger. The patient reports the wound appears to be getting better since the last visit. He denies associated pain.  He is here for a post operative wound check.

## 2025-06-04 NOTE — PHYSICAL EXAM
[1+] : right 1+ [Alert] : alert [Oriented to Person] : oriented to person [Oriented to Place] : oriented to place [Oriented to Time] : oriented to time [de-identified] : Awake and Alert [FreeTextEntry1] : biphasic radial artery pulse [de-identified] : right pointer finger with  ulceration - healing , remainder of the fingers are healed [de-identified] : Appropriate

## 2025-06-04 NOTE — ASSESSMENT
[FreeTextEntry1] : 63 yo male with severe PAD.  He is also status post an angiogram of his right upper extremity and angioplasty of his right radial artery secondary to gangrene of multiple fingers. His pointer finger remained unhealed despite undergoing local wound care and an arterial duplex demonstrated a recurrent radial artery occlusion. He is now s/p a repeat right upper extremity angiogram  and angioplasty of his right radial artery. On exam the patient has a 1+ radial artery pulse and a biphasic radial artery signal. The patient's ulceration appears to be healing. Patient understands that he may need a repeat arterial intervention if the wound does not heal despite local wound care.  The patient is scheduled to see a podiatrist in the upcoming weeks. He will follow up with me after his appointment with the podiatrist. Continue local wound care to pointer finger daily.

## 2025-06-04 NOTE — HISTORY OF PRESENT ILLNESS
[FreeTextEntry1] : 63-year-old male well-known to me.  He is status post a right upper extremity angiogram and angioplasty of his radial artery for gangrene of his pointer finger.  Patient reports that he has been undergoing local wound care at [facility] performed by his hand surgeon.  He reports that his finger pain has resolved.  He reports that his wound is healing. He continues to take Eliquis and Plavix as prescribed.  [de-identified] : 64-year-old male well-known to me. He has a history of a right pop dp bypass, right TMA and a left lower extremity angiogram and angioplasty for gangrene. He is status post a right upper extremity angiogram and angioplasty of his radial artery for gangrene of his pointer finger. He continued to have an ulcer despite local wound care and underwent an arterial duplex which demonstrated a recurrent radial artery occlusion.  He is now s/p a repeat right upper extremity angiogram and angioplasty of his radial artery for gangrene of his pointer finger. The patient reports the wound appears to be getting better since the last visit. He denies associated pain.  He is here for a post operative wound check.

## 2025-06-04 NOTE — ASSESSMENT
[FreeTextEntry1] : 65 yo male with severe PAD.  He is also status post an angiogram of his right upper extremity and angioplasty of his right radial artery secondary to gangrene of multiple fingers. His pointer finger remained unhealed despite undergoing local wound care and an arterial duplex demonstrated a recurrent radial artery occlusion. He is now s/p a repeat right upper extremity angiogram  and angioplasty of his right radial artery. On exam the patient has a 1+ radial artery pulse and a biphasic radial artery signal. The patient's ulceration appears to be healing. Patient understands that he may need a repeat arterial intervention if the wound does not heal despite local wound care.  The patient is scheduled to see a podiatrist in the upcoming weeks. He will follow up with me after his appointment with the podiatrist. Continue local wound care to pointer finger daily.

## 2025-06-04 NOTE — PHYSICAL EXAM
[1+] : right 1+ [Alert] : alert [Oriented to Person] : oriented to person [Oriented to Place] : oriented to place [Oriented to Time] : oriented to time [de-identified] : Awake and Alert [FreeTextEntry1] : biphasic radial artery pulse [de-identified] : right pointer finger with  ulceration - healing , remainder of the fingers are healed [de-identified] : Appropriate

## 2025-06-18 NOTE — HISTORY OF PRESENT ILLNESS
[FreeTextEntry1] : DOS:  2025   Patient Name:  NALDO MORRIS   : 1960  MRN:  [id number]        in person  (L)  PCP:  Dr. Josie Avendano (25)        Nephrology: Dr. Hero Fleming -> C-P to discuss transplant - on list     2019 started on dialysis - [facility].         Podiatry:  Dr. Tomi Sanchez        Vasc:  Dr. Pk Moreno        Card:  Dr. Theodora Foster         GI: Dr. Audi Kay - colonoscopy          Eyes:  [place] on [facility] - a few months ago.  Advised he has cataracts.                        CC: Type 1 Diabetes (since age 13)         2019  A1c 8.3   20 A1c 9.9 %     2023 A1c 9.7%;  3/2024 A1c 6.6%    2024  8.1*             CRF (proteinuria ~)          2019:  started dialysis at [facility]             PVD - 2 stents R foot ~2010 R great toe surgery;  then 2nd toe; then R TMT.3/10/2020                         Now s/p R TMT and L great toe surgery              2019  TSH 5.41;   3/2021  TSH 4.51   21  TSH 3.27  63 yo visits for  Type 1 diabetes since age 13, on dialysis since 2019, on transplant list, PVD s/p LE stents, s/o R TMT, L great toe surgery, on Eliquis for atrial fibrillation since 2023,      Recently had URI - treated at [facility].   Has seen Dr. Foster for bradycardia   Had been followed as outpatient by Dr. Comer - sometimes at [facility], for finger  wound.   Sees Dr. Mims at [facility]  -   R index finger.  He will  see her later today 25).  Seeing Dr. Kay for treatment of retina. On hiatus from cardiac rhab   Recently told he is not on transplant list, but he is looking into alternatives.    Hospitalized at [facility] - summary appended.    Now following a strict diet and reports sugars much improved.  Doing Cardiac Rehab at [facility]     Injecting insulin at least 3X a day: Basaglar 25 units louis AM  -> LANTUS   And Humalog 75/25  TID AC  - by sliding scale:  if under 140:  0:   if over 140 4 uits;  over 200:  6 units. Jardiance    CGM:  Ann 3 Plus  14 days GMI 8.9% > 250  35% 181-250  4 &   44 %54-70  0 %  : : Constitutional:  Alert, well nourished, healthy appearance, no acute distress  Eyes:  No proptosis, no stare Thyroid: Pulmonary:  No respiratory distress, no accessory muscle use; normal rate and effort Cardiac:  Normal rate Vascular:  Endocrine:  No stigmata of Cushings Syndrome Musculoskeletal:  Normal gait, no involuntary movements Neurology:  No tremors Affect/Mood/Psych:  Oriented x 3; normal affect, normal insight/judgement, normal mood  .   Imp:  Infection R 2nd finger needs f/u to [facility]. Feet followed by Dr. Sanchez Continues to find Libre3 helpful and will continue. A1c from D. requested.      2025     in person     PCP:  Dr. Josie Avendano (20)        Nephrology: Dr. Hero Fleming -> C-P to discuss transplant - on list     2019 started on dialysis - [facility].         Podiatry:  Dr. Tomi Sanchez        Vasc:  Dr. Pk Moreno        Card:  Dr. Theodora Foster         GI: Dr. Audi Kay - colonoscopy          Eyes:  [place] on [facility] - a few months ago.  Advised he has cataracts.                        CC: Type 1 Diabetes (since age 13)         2019  A1c 8.3   20 A1c 9.9 %     2023 A1c 9.7%;  3/2024 A1c 6.6%    2024  8.1*             CRF (proteinuria ~)          2019:  started dialysis at [facility]             PVD - 2 stents R foot ~2010 R great toe surgery;  then 2nd toe; then R TMT.3/10/2020                         Now s/p R TMT and L great toe surgery              2019  TSH 5.41;   3/2021  TSH 4.51   21  TSH 3.27  63 yo visits for  Type 1 diabetes since age 13, on dialysis since 2019, on transplant list, PVD s/p LE stents, s/o R TMT, L great toe surgery, on Eliquis for atrial fibrillation since 2023,      Recently had URI - treated at [facility].   Saw Dr. Foster for show pulse in order to optimize him for kidney transplant - will see them 2025  Had been followed as outpatient by Dr. Comer - sometimes at [facility], for finger  wound.   Sees Dr. Robb now for right 2nd finger wound - still present***  Hospitalized at [facility] - summary appended.    Now following a strict diet and reports sugars much improved.  Doing Cardiac Rehab at [facility]     Injecting insulin at least 3X a day: Basaglar 25 units louis AM  -> LANTUS   And Humalog 75/25  TID AC  - by sliding scale:  if under 140:  0:   if over 140 4 uits;  over 200:  6 units.  Imp:  Infection R 2nd finger needs f/u to [facility]. Feet followed by Dr. Sanchez Continues to find Libre3 helpful and will continue. A1c from DKarri requested.        Sep 17, 2024   in person    Has [employer] - wants Rx t o [facility]   PCP:  Dr. Josie Avendano (20)        Nephrology: Dr. Hero Fleming -> C-P to discuss transplant - on list     2019 started on dialysis - [facility].         Podiatry:  Dr. Tomi Sanchez        Vasc:  Dr. Pk Moreno        Card:  Dr. Theodora Foster         GI: Dr. Audi Kay - colonoscopy          Eyes:  [place] on [facility] - a few months ago.  Advised he has cataracts.                        CC: Type 1 Diabetes (since age 13)         2019  A1c 8.3   20 A1c 9.9 %     2023 A1c 9.7%;  3/2024 A1c 6.6%    2024  8.1*             CRF (proteinuria ~1992)          2019:  started dialysis at [facility]             PV - 2 stents R foot ~2010 R great toe surgery;  then 2nd toe; then R TMT.3/10/2020                         Now s/p R TMT and L great toe surgery              2019  TSH 5.41;   3/2021  TSH 4.51   21  TSH 3.27  63 yo with Type 1 diabetes since age 13, on dialysis since 2019, on transplant list, PVD s/p LE stents, s/o R TMT, L great toe surgery, on Eliquis for atrial fibrillation since 2023,   Had been followed as outpatient by Dr. Comer - sometimes at [facility], for finger  wound. Hospitalized at [facility] - summary appended.    Now following a strict diet and reports sugars much improved.    Injecting insulin at least 3X a day: Basaglar 25 units louis AM   And Humalog 75/25  TID AC  - by sliding scale:  if under 140:  0:   if over 140 4 uits;  over 200:  6 units.  : : Constitutional:  Alert, well nourished, healthy appearance, no acute distress  Eyes:  No proptosis, no stare Thyroid: Pulmonary:  No respiratory distress, no accessory muscle use; normal rate and effort Cardiac:  Normal rate Vascular:  Endocrine:  No stigmata of Cushings Syndrome Musculoskeletal:  Normal gait, no involuntary movements Neurology:  No tremors Affect/Mood/Psych:  Oriented x 3; normal affect, normal insight/judgement, normal mood  . Impression:  Most recent A1c has drifted up to 8.1% He agrees there is room for improvement and he has previously been successful.  Plan:  Dietary adjustment. He continues to find Ann helpful.     2024     in person                      PCP:  Dr. Josie Avendano (20)        Nephrology: Dr. Hero Fleming -> C-P to discuss transplant - on list     2019 started on dialysis - [facility].         Podiatry:  Dr. Tomi Sanchez        Vasc:  Dr. Pk Moreno        Card:  Dr. Theodora Foster         GI: Dr. Audi Kay - colonoscopy          Eyes:  [place] on [facility] - a few months ago.  Advised he has cataracts.                        CC: Type 1 Diabetes (since age 13)         2019  A1c 8.3   20 A1c 9.9 %     2023 A1c 9.7%;  3/2024 A1c 6.6%    2024  8.1*             CRF (proteinuria ~)          2019:  started dialysis at [facility]             PVD - 2 stents R foot ~2010 R great toe surgery;  then 2nd toe; then R TMT.3/10/2020                         Now s/p R TMT and L great toe surgery              2019  TSH 5.41;   3/2021  TSH 4.51   21  TSH 3.27  63 yo with Type 1 diabetes since age 13, on dialysis since 2019, on transplant list, PVD s/p LE stents, s/o R TMT, L great toe surgery, on Eliquis for atrial fibrillation since 2023,   Had been followed as outpatient by Dr. oCmer - sometimes at [facility], for finger  wound. Hospitalized at [facility] - summary appended.    Now following a strict diet and reports sugars much improved.   Checks fingersticks at least 4X a day. Injecting insulin at least 3X a day: Basaglar 25 units louis AM   And Humalog 75/25  TID AC  - by sliding scale:  if under 140:  0:   if over 140 4 uits;  over 200:  6 units.  : : Constitutional:  Alert, well nourished, healthy appearance, no acute distress  Eyes:  No proptosis, no stare Thyroid: Pulmonary:  No respiratory distress, no accessory muscle use; normal rate and effort Cardiac:  Normal rate Vascular:  Endocrine:  No stigmata of Cushings Syndrome Musculoskeletal:  Normal gait, no involuntary movements Neurology:  No tremors Affect/Mood/Psych:  Oriented x 3; normal affect, normal insight/judgement, normal mood  .  Impression:  Recent A1c up to 8.1 and Libre3 GMI is 8.9.    Plan:  He continues to find Ann helpful in diabetes decisions and insulin dosing. A1c indicates room for improvement.   2024    in person  PCP:  Dr. Josie Avendano (20)        Nephrology: Dr. Hero Fleming -> C-P to discuss transplant - on list     2019 started on dialysis - [facility].         Podiatry:  Dr. Tomi Sanchez        Vasc:  Dr. Pk Moreno        Card:  Dr. Theodora Foster         GI: Dr. Audi Kay - colonoscopy          Eyes:  [place] on [facility] - a few months ago.  Advised he has cataracts.                        CC: Type 1 Diabetes (since age 13)         2019  A1c 8.3   20 A1c 9.9 %     2023 A1c 9.7%;  3/2024 A1c 6.6%              CRF (proteinuria ~1992)          2019:  started dialysis at [facility]             PVD - 2 stents R foot ~2010 R great toe surgery;  then 2nd toe; then R TMT.3/10/2020                         Now s/p R TMT and L great toe surgery              2019  TSH 5.41;   3/2021  TSH 4.51   21  TSH 3.27  62 yo with Type 1 diabetes since age 13, on dialysis since 2019, on transplant list, PVD s/p LE stents, s/o R TMT, L great toe surgery, on Eliquis for atrial fibrillation since 2023, anticipates Watchman procedure at [facility] by Dr. Reed end of May 2023.  Had been followed as outpatient by Dr. Comer - sometimes at [facility], for finger  wound. Hospitalized at [facility] - summary appended.    Now following a strict diet and reports sugars much improved.   Cheks fingersticks at least 4X a day. Injecting insulin at least 3X a day: Basaglar 25 units louis AM   And Humalog 75/25  TID AC  - by sliding scale:  if under 140:  0:   if over 140 4 uits;  over 200:  6 units.  : : Constitutional:  Alert, well nourished, healthy appearance, no acute distress  Eyes:  No proptosis, no stare Thyroid: Pulmonary:  No respiratory distress, no accessory muscle use; normal rate and effort Cardiac:  Normal rate Vascular:  Endocrine:  No stigmata of Cushings Syndrome Musculoskeletal:  Normal gait, no involuntary movements Neurology:  No tremors Affect/Mood/Psych:  Oriented x 3; normal affect, normal insight/judgement, normal mood  . Impression:  His fingerstick BS and A1c results show that he is doing very good job of cotrolling sugars currently. He would be appropriate candidate for the Ann 3.  Plan:  Same Rx. Ann 3 requested via [employer].     A1c trajectory 2022  A1c 9.8 Dec 2022 A1c 10.0% Mar 8, 2023 A1c 9.8 % at [facility]  2024  A1c 8.4 %     2024    in person                  PCP:  Dr. Josie Avendano (20)        Nephrology: Dr. Hero Fleming -> [facility] to discuss transplant - on list     2019 started on dialysis - [facility].         Podiatry:  Dr. Tomi Sanchez        Vasc:  Dr. Pk Moreno        Card:  Dr. Theodora Foster         GI: Dr. Audi Kay - colonoscopy          Eyes:  [place] on [place] - [facility] - a few months ago.  Advised he has cataracts.                        CC: Type 1 Diabetes (since age 13)         2019  A1c 8.3   20 A1c 9.9 %          His [employer] does not have NFC.               CRF (proteinuria ~)          2019:  started dialysis at [facility]             PVD - 2 stents R foot ~2010 R great toe surgery;  then 2nd toe; then R TMT.3/10/2020                         Now s/p R TMT and L great toe surgery              2019  TSH 5.41;   3/2021  TSH 4.51   21  TSH 3.27  62 yo with Type 1 diabetes since age 13, on dialysis since 2019, on transplant list, PVD s/p LE stents, s/o R TMT, L great toe surgery, on Eliquis for atrial fibrillation since 2023, anticipates Watchman procedure at - by Dr. Reed end of May 2023.  Had been followed as outpatient by Dr. Comer - sometimes at [facility], for finger  wound. Hospialized at [facility] - summary appended.    Now following a strict diet and reports sugars much improved.   Cheks fingersticks at least 4X a day. Injecting insulin at least 3X a day: Basaglar 25 units louis AM   And Humalog 75/25  TID AC  - by sliding scale:  if under 140:  0:   if over 140 4 uits;  over 200:  6 units.  A1c trajectory 2022  A1c 9.8 Dec 2022 A1c 10.0% Mar 8, 2023 A1c 9.8 % at [facility]   : : Constitutional:  Alert, well nourished, healthy appearance, no acute distress  Eyes:  No proptosis, no stare Thyroid: Pulmonary:  No respiratory distress, no accessory muscle use; normal rate and effort Cardiac:  Normal rate Vascular:  Endocrine:  No stigmata of Cushings Syndrome Musculoskeletal:  Normal gait, no involuntary movements Neurology:  No tremors Affect/Mood/Psych:  Oriented x 3; normal affect, normal insight/judgement, normal mood  .  Impression:  Brittle Type 1 diabetes, on dialysis.  Would benefit from use of CGM - e.g., Ann 3.   2024     telephonic     recently admitted to [facility] - elev K and infected finger  PCP:  Dr. Josie Avendano (20)        Nephrology: Dr. Hero Fleming -> C-P to discuss transplant - on list     2019 [facility]         Podiatry:  Dr. Tomi Garcia:  Dr. Pk Moreno        Card:  Dr. Theodora Foster         GI: Dr. Audi Kay - colonoscopy          Eyes:  [place] on [facility] - a few months ago.  Advised he has cataracts.                        CC: Type 1 Diabetes (since age 13)         2019  A1c 8.3   20 A1c 9.9 %          His [facility] does not have NFC.               CRF (proteinuria ~)          2019:  started dialysis at [facility]             PVD - 2 stents R foot ~2010 R great toe surgery;  then 2nd toe; then R TMT.3/10/2020                         Now s/p R TMT and L great toe surgery              2019  TSH 5.41;   3/2021  TSH 4.51   21  TSH 3.27  62 yo with Type 1 diabetes since age 13, on dialysis since 2019, on transplant list, PVD s/p LE stents, s/o R TMT, L great toe surgery, on Eliquis for atrial fibrillation since 2023, anticipates Watchman procedure at [facility] by Dr. Reed end of May 2023.  Had been followed as outpatient by Dr. Comer - sometimes at [facility], for finger  wound. Hospialized at [facility] - summary appended.    Now following a strict diet and reports sugars much improved.   Injecting insulin at least 3X a day: Basaglar 25 units louis AM   And Humalog 75/25  TID AC  - by sliding scale:  if under 140:  0:   if over 140 4 uits;  over 200:  6 units.   2022  A1c 9.8 Dec 2022 A1c 10.0% Mar 8, 2023 A1c 9.8 % at [facility]   Imp/Plan:  All things considered, he has been doing a good job taking care of himself and diabetes.       May 16, 2023    in person  PCP:  Dr. Josie Avendano (20)        Nephrology: Dr. Hero Fleming -> C-P to discuss transplant - on list     2019 [facility].         Podiatry:  Dr. Tomi Garcia:  Dr. Pk Moreno        Card:  Dr. Theodora Foster         GI: Dr. Audi Kay - colonoscopy          Eyes:  [place] on [facility] - a few months ago.  Advised he has cataracts.                        CC: Type 1 Diabetes (since age 13)         2019  A1c 8.3   20 A1c 9.9 %          His [employer] does not have NFC.               CRF (proteinuria ~)          2019:  started dialysis at [facility]             PV - 2 stents R foot ~2010 R great toe surgery;  then 2nd toe; then R TMT.3/10/2020                         Now s/p R TMT and L great toe surgery              2019  TSH 5.41;   3/2021  TSH 4.51   21  TSH 3.27  61 yo with Type 1 diabetes since age 13, on dialysis since 2019, on transplant list, PVD s/p LE stents, s/o R TMT, L great toe surgery, on Eliquis for atrial fibrillation since 2023, anticipates Watchman procedure at [facility] by Dr. Reed end of May 2023.  Brings in his Freestyle Lite meter and data reviewed - testing 4X a day with  One Touch Ultra Mini which shows his sugars run on the high side - in part because of dialysis on Mon, Wed, Fri   [facility] will be faxing results to us.      No recent hypoglycemia.     Injecting insulin at least 3X a day: Basaglar 23 units louis AM   And Humalog 75/25  TID AC  - by sliding scale:  if under 140:  0:   if over 140 4 uits;  over 200:  6 units.  2022  A1c 9.8 Dec 2022 A1c 10.0% Mar 8, 2023 A1c 9.8 % at [facility]   : : Constitutional:  Alert, well nourished, healthy appearance, no acute distress  Eyes:  No proptosis, no stare Thyroid: Pulmonary:  No respiratory distress, no accessory muscle use; normal rate and effort Cardiac:  Normal rate Vascular:  Endocrine:  No stigmata of Cushing's Syndrome Musculoskeletal:  Normal gait, no involuntary movements Neurology:  No tremors Affect/Mood/Psych:  Oriented x 3; normal affect, normal insight/judgement, normal mood  .  Impression:  Anticipates bilateral cateract surgery - Dr. Roa. Feet being monitored b Dr. Sanchez  Doing his best to control sugars   Dec 13, 2022     in person  PCP:  Dr. Josie Avendano (20)        Nephrology: Dr. Hero Fleming -> C-P to discuss transplant - on list     2019 started on dialysis - [facility].         Podiatry:  Dr. Tomi Sanchez        Vasc:  Dr. Pk Moreno        Card:  Dr. Theodora Foster         GI: Dr. Audi Kay - colonoscopy          Eyes:  [place] on [facility] - a few months ago.  Advised he has cataracts.                        CC: Type 1 Diabetes (since age 13)         2019  A1c 8.3   20 A1c 9.9 %          His [facility] does not have NFC.               CRF (proteinuria ~)          2019:  started dialysis at [facility]             PVD - 2 stents R foot ~2010 R great toe surgery;  then 2nd toe; then R TMT.3/10/2020                         Now s/p R TMT and L great toe surgery              2019  TSH 5.41;   3/2021  TSH 4.51   21  TSH 3.27  62 yo visits for Type 1 diabetes.   Lab tests at [facility] on [place], learned of "insulin ":  Actos, Januvia, semaglutide  Brings in his Freestyle Lite meter and data reviewed - testing 4X a day. No recent hypoglycemia.     Injecting insulin at least 3X a day: Basaglar 23 units louis AM   And Humalog 75/25  TID AC  - by sliding scale:  if under 140:  0:   if over 140 4 uits;  over 200:  6 units.   A1c 9.8  : : Constitutional:  Alert, well nourished, healthy appearance, no acute distress  Eyes:  No proptosis, no stare Thyroid: Pulmonary:  No respiratory distress, no accessory muscle use; normal rate and effort Cardiac:  Normal rate Vascular:  Endocrine:  No stigmata of Cushing's Syndrome Musculoskeletal:  Normal gait, no involuntary movements Neurology:  No tremors Affect/Mood/Psych:  Oriented x 3; normal affect, normal insight/judgement, normal mood  .  Impression:  Room for improvement. Plan:  f/u eye exam.   Lbrie 2 requested from [facility].      2022      in person               No success with EP, Bor.        [facility].      PCP:  Dr. Josie Avendano (20)        Nephrology: Dr. Hero Fleming -> C-P to discuss transplant - on list     2019 started on dialysis - [facility].         Podiatry:  Dr. Tomi Sanchez        Vasc:  Dr. Pk Moreno        Card:  Dr. Theodora Foster         GI: Dr. Audi Kay - colonoscopy          Eyes:  [place] on [facility] - a few months ago.  Advised he has cataracts.                        CC: Type 1 Diabetes (since age 13)         2019  A1c 8.3   20 A1c 9.9 %          His [facility] does not have NFC.               CRF (proteinuria ~)          2019:  started dialysis at [facility]             PVD - 2 stents R foot ~2010 R great toe surgery;  then 2nd toe; then R TMT.3/10/2020                         Now s/p R TMT and L great toe surgery              2019  TSH 5.41;   3/2021  TSH 4.51   21  TSH 3.27  62 yo visits for Type 1 diabetes.   Lab tests at [facility] on [place], learned of "insulin ":  Actos, Januvia, semaglutide  Brings in his Freestyle Lite meter and data reviewed - testing 4X a day. No recent hypoglycemia.     Taking:   Basaglar 23 units louis AM   And Humalog 75/25  TID AC  - by sliding scale:  if under 140:  0:   if over 140 4 uits;  over 200:  6 units.    : : Constitutional:  Alert, well nourished, healthy appearance, no acute distress  Eyes:  No proptosis, no stare Thyroid: Pulmonary:  No respiratory distress, no accessory muscle use; normal rate and effort Cardiac:  Normal rate Vascular:  Endocrine:  No stigmata of Cushing's Syndrome Musculoskeletal:  Normal gait, no involuntary movements Neurology:  No tremors Affect/Mood/Psych:  Oriented x 3; normal affect, normal insight/judgement, normal mood  .  Impression:  A1c and fingerstick BS suggest room to raise dose of insulin; Plan: Inc Basaglar to 24 units. Call me one week.      Mar 22, 2022      in person     primary:  [place] Medicare Rx and [place] Medicaid secondary.  NOT covered for EP    PCP:  Dr. Josie Avendano (20)        Nephrology: Dr. Hero Fleming -> C-P to discuss transplant - on list     2019 started on dialysis - [facility].         Podiatry:  Dr. Tomi Sanhcez        Vasc:  Dr. Pk Moreno        Card:  Dr. Theodora Foster         GI: Dr. Audi Kay - colonoscopy          Eyes:  [place] on [facility] - a few months ago.  Advised he has cataracts.                        CC: Type 1 Diabetes (since age 13)         2019  A1c 8.3   20 A1c 9.9 %          His [facility] does not have NFC.               CRF (proteinuria ~)          2019:  started dialysis at [facility]             PVD - 2 stents R foot ~2010 R great toe surgery;  then 2nd toe; then R TMT.3/10/2020                         Now s/p R TMT and L great toe surgery              2019  TSH 5.41;   3/2021  TSH 4.51   21  TSH 3.27  62 yo visits for Type 1 diabetes.   Lab tests at [facility] on 2021 A1c 10.1 %      fructosamine 548   He remains on dialysis at [facility] on Mon, Wed, Fri.  Brings in the OneTouch Ultra Mini with which he is checking his fingerstick sugars at least 4X a day - shows wide fluctuations.   To control diabetes his insulin Rx is:   Lantus pens 22 units at 5 am and now switched to Basaglar And Humalog 75/25  TID 1c - by sliding scale:  if under 140:  0:   if over 140 4 uits;  over 200:  6 units.  : : Constitutional:  Alert, well nourished, healthy appearance, no acute distress  Eyes:  No proptosis, no stare Thyroid: Pulmonary:  No respiratory distress, no accessory muscle use; normal rate and effort Cardiac:  Normal rate Vascular:  Endocrine:  No stigmata of Cushing's Syndrome Musculoskeletal:  Normal gait, no involuntary movements Neurology:  No tremors Affect/Mood/Psych:  Oriented x 3; normal affect, normal insight/judgement, normal mood  .  Impression:  Although he is doing his best to control sugars, the renal failure, dialysis make it more challenging. Fortunately, no current foot lesions. He anticipates need for cataract surgery by Dr. Roa and will touch base with me on insulin dosing priror to that. ROV no later than July. He will stop by [facility] today regarding CGM.     Sep 13, 2021      in person               PCP:  Dr. Josie Avendano (20)        Nephrology: Dr. Hero Fleming -> C-P to discuss transplant - on list     2019 started on dialysis - [facility].         Podiatry:  Dr. Tomi Sanchez        Vasc:  Dr. Pk Moreno        Card:  Dr. Theodora Foster         GI: Dr. Audi Kay - colonoscopy          Eyes:  [place] on [facility] - a few months ago.  Advised he has cataracts.                        CC: Type 1 Diabetes (since age 13)         2019  A1c 8.3   20 A1c 9.9 %          His [facility] [place] not have [facility].               CRF (proteinuria ~)          2019:  started dialysis at [facility]             PVD - 2 stents R foot ~2010 R great toe surgery;  then 2nd toe; then R TMT.3/10/2020                         Now s/p R TMT and L great toe surgery              2019  TSH 5.41;   3/2021  TSH 4.51   21  TSH 3.27  62 yo visits for Type 1 diabetes.   Lab tests at [place] on 2021 A1c 10.1 %      fructosamine 548  glucose 295 mg/dl  creatinine 6.32 TSH 3.27 (had been 5.41 in 2019 and 4.51 in Mar 2021)   Goes for dialysis on M, W, F  - prone to hypoglycemia during dialysis  Has Lantus pens at home and takes 22 units at 5 am and insurance now covers Semglee (also glargine) And Humalog 75/25  TID 1c - by sliding scale:  if under 140:  0:   if over 140 4 uits;  over 200:  6 units.  No open wounds. Still has burning sensation of lower extremities .    Plan:  Ann 14 requested via [facility]  ([employer] used [facility])   ROV in  and March.    .

## 2025-06-27 NOTE — HISTORY OF PRESENT ILLNESS
[FreeTextEntry1] : annual [de-identified] : Patient is a 64M with Type 1 DM (diagnosed at 14yo), ESRD on HD M/W/F (Since sept 2019, follows with Dr. Fleming, HD at [facility]), CAD s/p mLAD PCI (9/22), diastolic dysfunction, PAD (following with Dr. Moreno)with hx of RLE balloon angioplasty and 2 RANJITH in right distal AT (2/2015), right popliteal to DP bypass and right first metatarsal amputation, amputation of 2nd toe and partial metatarsal 3/5/2020 followed by a trans-metatarsal amputation on 3/10/2020, left partial great toe and left partial 2nd toe amputation (2021), recent staged PCI (1 on 7/11/24, 2 on 7/31/24) previous PCI to mLAD in 2022,  pAF (EP Dr. Corrie Dimas) presents today for  Wellness  rejected from kidney transplant due to heart condition ([facility]) Sees: Dr. Monica Gomez ([facility]) recommended to see ortho hand Sees Vascular: Dr. Moreno, no signal in palmar arch or digital artery  Eye: Dr. Kay -  injections every 3 months

## 2025-06-27 NOTE — ASSESSMENT
[Vaccines Reviewed] : Immunizations reviewed today. Please see immunization details in the vaccine log within the immunization flowsheet.  [FreeTextEntry1] : overdue for colonoscopy - referred to GI PSA today Will check vaccinations with [facility] Up to date with eye screenings

## 2025-06-27 NOTE — HEALTH RISK ASSESSMENT
[Good] : ~his/her~  mood as  good [No] : In the past 12 months have you used drugs other than those required for medical reasons? No [No falls in past year] : Patient reported no falls in the past year [0] : 2) Feeling down, depressed, or hopeless: Not at all (0) [PHQ-2 Negative - No further assessment needed] : PHQ-2 Negative - No further assessment needed [Never] : Never [Patient reported colonoscopy was abnormal] : Patient reported colonoscopy was abnormal [None] : Patient does not have any barriers to medication adherence [Yes] : Reviewed medication list for presence of high-risk medications. [NO] : No [Audit-CScore] : 0 [[id number]] : 0 [ColonoscopyDate] : 12/11/2020 [ColonoscopyComments] : 3 year follow up

## 2025-06-30 NOTE — HEALTH RISK ASSESSMENT
[Good] : ~his/her~  mood as  good [Never (0 pts)] : Never (0 points) [No] : In the past 12 months have you used drugs other than those required for medical reasons? No [No falls in past year] : Patient reported no falls in the past year [0] : 2) Feeling down, depressed, or hopeless: Not at all (0) [PHQ-2 Negative - No further assessment needed] : PHQ-2 Negative - No further assessment needed [Never] : Never [[id number]] : 0 [ColonoscopyDate] : 12/11/2020